# Patient Record
Sex: FEMALE | Race: WHITE | NOT HISPANIC OR LATINO | Employment: FULL TIME | URBAN - NONMETROPOLITAN AREA
[De-identification: names, ages, dates, MRNs, and addresses within clinical notes are randomized per-mention and may not be internally consistent; named-entity substitution may affect disease eponyms.]

---

## 2019-02-28 ENCOUNTER — TELEPHONE (OUTPATIENT)
Dept: FAMILY MEDICINE CLINIC | Facility: CLINIC | Age: 34
End: 2019-02-28

## 2019-02-28 NOTE — TELEPHONE ENCOUNTER
FEVER ,CHILLS,CONGESTION,COUGH NON PRODUCTIVE  TEMP 103 6  STARTED WITH TEMP DURING NIGHT ACHINESS AND ALL THAT STARTED YESTERDAY

## 2019-02-28 NOTE — TELEPHONE ENCOUNTER
Unfortunately, we have not seen her in over 3 years  She would be a new patient  She can see 1 of us today or go to urgent care

## 2019-10-28 ENCOUNTER — OFFICE VISIT (OUTPATIENT)
Dept: FAMILY MEDICINE CLINIC | Facility: CLINIC | Age: 34
End: 2019-10-28
Payer: COMMERCIAL

## 2019-10-28 VITALS
DIASTOLIC BLOOD PRESSURE: 90 MMHG | BODY MASS INDEX: 32.49 KG/M2 | SYSTOLIC BLOOD PRESSURE: 138 MMHG | WEIGHT: 195 LBS | TEMPERATURE: 98.5 F | HEIGHT: 65 IN

## 2019-10-28 DIAGNOSIS — J02.9 SORE THROAT: Primary | ICD-10-CM

## 2019-10-28 DIAGNOSIS — Z13.6 SCREENING FOR CARDIOVASCULAR CONDITION: ICD-10-CM

## 2019-10-28 PROCEDURE — 3008F BODY MASS INDEX DOCD: CPT | Performed by: FAMILY MEDICINE

## 2019-10-28 PROCEDURE — 99202 OFFICE O/P NEW SF 15 MIN: CPT | Performed by: FAMILY MEDICINE

## 2019-10-28 RX ORDER — AMOXICILLIN 500 MG/1
CAPSULE ORAL
Qty: 40 CAPSULE | Refills: 0 | Status: SHIPPED | OUTPATIENT
Start: 2019-10-28 | End: 2019-11-08

## 2019-10-28 RX ORDER — LANSOPRAZOLE 15 MG/1
15 CAPSULE, DELAYED RELEASE ORAL AS NEEDED
COMMUNITY

## 2019-10-28 NOTE — PROGRESS NOTES
Assessment/Plan: For her sore throat, Rx for amoxicillin  Push fluids  Call us if symptoms continue or increase  Patient does not wish to have flu shot  When she is feeling better, Rx given for fasting blood work to get done  We will see her back yearly and p r n  Problem List Items Addressed This Visit     None      Visit Diagnoses     Sore throat    -  Primary    Relevant Medications    amoxicillin (AMOXIL) 500 mg capsule    Screening for cardiovascular condition        Relevant Orders    Comprehensive metabolic panel    Lipid Panel with Direct LDL reflex           Diagnoses and all orders for this visit:    Sore throat  -     amoxicillin (AMOXIL) 500 mg capsule; 2 capsules po twice a day for 10 days    Screening for cardiovascular condition  -     Comprehensive metabolic panel  -     Lipid Panel with Direct LDL reflex    Other orders  -     lansoprazole (PREVACID) 15 mg capsule; Take 15 mg by mouth as needed        No problem-specific Assessment & Plan notes found for this encounter  Subjective:      Patient ID: Nikhil Ross is a 36 y o  female  Patient here today stating that for the last couple days has had a sore throat  This morning had a temperature of 99 8°  No nausea vomiting  No real congestion  It feels sharp" when she swallows  Sore Throat    This is a new problem  The current episode started in the past 7 days  The problem has been gradually worsening  Neither side of throat is experiencing more pain than the other  Maximum temperature: 99 8  The fever has been present for 1 to 2 days  The pain is moderate  Pertinent negatives include no congestion, coughing, diarrhea, ear pain, neck pain or vomiting  She has tried NSAIDs for the symptoms  The treatment provided mild relief  The following portions of the patient's history were reviewed and updated as appropriate:   She has a past medical history of GERD (gastroesophageal reflux disease)  ,  does not have a problem list on file ,   has a past surgical history that includes Eye surgery; Esophagoscopy; and Mouth surgery  ,  family history includes Cancer in her paternal grandfather; Heart attack in her paternal grandmother; Hypertension in her father, paternal grandfather, and paternal grandmother; Lung cancer in her maternal grandfather  ,   reports that she has never smoked  She has never used smokeless tobacco  She reports that she drinks alcohol  Her drug history is not on file  ,  is allergic to sulfate     Current Outpatient Medications   Medication Sig Dispense Refill    lansoprazole (PREVACID) 15 mg capsule Take 15 mg by mouth as needed      amoxicillin (AMOXIL) 500 mg capsule 2 capsules po twice a day for 10 days 40 capsule 0     No current facility-administered medications for this visit  Review of Systems   Constitutional: Negative  HENT: Positive for sore throat  Negative for congestion and ear pain  Respiratory: Negative  Negative for cough  Cardiovascular: Negative  Gastrointestinal: Negative  Negative for diarrhea and vomiting  Genitourinary: Negative  Musculoskeletal: Negative for neck pain  Objective:  Vitals:    10/28/19 1204   BP: 138/90   Temp: 98 5 °F (36 9 °C)   Weight: 88 5 kg (195 lb)   Height: 5' 5" (1 651 m)     Body mass index is 32 45 kg/m²  Physical Exam   Constitutional: She is oriented to person, place, and time  She appears well-developed and well-nourished  No distress  HENT:   Head: Normocephalic and atraumatic  Mouth/Throat: Posterior oropharyngeal erythema present  Eyes: Conjunctivae are normal    Cardiovascular: Normal rate, regular rhythm and normal heart sounds  Exam reveals no gallop and no friction rub  No murmur heard  Pulmonary/Chest: Effort normal and breath sounds normal  No respiratory distress  She has no wheezes  She has no rales  Musculoskeletal: She exhibits no edema  Neurological: She is alert and oriented to person, place, and time  Skin: She is not diaphoretic  Psychiatric: She has a normal mood and affect  Her behavior is normal  Judgment and thought content normal    Vitals reviewed

## 2020-01-06 ENCOUNTER — TELEPHONE (OUTPATIENT)
Dept: FAMILY MEDICINE CLINIC | Facility: CLINIC | Age: 35
End: 2020-01-06

## 2020-01-06 DIAGNOSIS — J02.9 SORE THROAT: Primary | ICD-10-CM

## 2020-01-06 RX ORDER — AMOXICILLIN 500 MG/1
CAPSULE ORAL
Qty: 40 CAPSULE | Refills: 0 | Status: SHIPPED | OUTPATIENT
Start: 2020-01-06 | End: 2020-01-16

## 2020-01-06 NOTE — TELEPHONE ENCOUNTER
Patient c/o sore throat and is requesting Amoxicillin be sent to the pharmacy  Patient states she had a couple days left of Amox from a previous prescription and she did take that Saturday and yesterday and is starting to feel better

## 2020-10-12 ENCOUNTER — TELEPHONE (OUTPATIENT)
Dept: FAMILY MEDICINE CLINIC | Facility: CLINIC | Age: 35
End: 2020-10-12

## 2020-10-12 DIAGNOSIS — J02.9 SORE THROAT: Primary | ICD-10-CM

## 2020-10-12 RX ORDER — AMOXICILLIN 500 MG/1
CAPSULE ORAL
Qty: 40 CAPSULE | Refills: 0 | Status: SHIPPED | OUTPATIENT
Start: 2020-10-12 | End: 2020-10-22

## 2021-01-07 ENCOUNTER — VBI (OUTPATIENT)
Dept: ADMINISTRATIVE | Facility: OTHER | Age: 36
End: 2021-01-07

## 2021-01-07 NOTE — TELEPHONE ENCOUNTER
01/07/21 11:58 AM     See documentation in the [facility]       Monica Colon

## 2021-08-10 ENCOUNTER — VBI (OUTPATIENT)
Dept: ADMINISTRATIVE | Facility: OTHER | Age: 36
End: 2021-08-10

## 2021-10-01 ENCOUNTER — VBI (OUTPATIENT)
Dept: ADMINISTRATIVE | Facility: OTHER | Age: 36
End: 2021-10-01

## 2021-12-14 ENCOUNTER — VBI (OUTPATIENT)
Dept: ADMINISTRATIVE | Facility: OTHER | Age: 36
End: 2021-12-14

## 2022-04-11 ENCOUNTER — AMB VIDEO VISIT (OUTPATIENT)
Dept: OTHER | Facility: HOSPITAL | Age: 37
End: 2022-04-11
Payer: COMMERCIAL

## 2022-04-11 DIAGNOSIS — J01.40 ACUTE PANSINUSITIS, RECURRENCE NOT SPECIFIED: Primary | ICD-10-CM

## 2022-04-11 PROCEDURE — 99212 OFFICE O/P EST SF 10 MIN: CPT | Performed by: NURSE PRACTITIONER

## 2022-04-11 RX ORDER — AMOXICILLIN 875 MG/1
875 TABLET, COATED ORAL 2 TIMES DAILY
Qty: 20 TABLET | Refills: 0 | Status: SHIPPED | OUTPATIENT
Start: 2022-04-11 | End: 2022-04-21

## 2022-04-11 NOTE — PATIENT INSTRUCTIONS
Rest and drink extra fluids  Start antibiotic  Take probiotic  OTC cough and cold medication as needed  Follow up with PCP if no improvement  Go to ER with any worsening symptoms chest pain or sob  Sinusitis   WHAT YOU NEED TO KNOW:   Sinusitis is inflammation or infection of your sinuses  Sinusitis is most often caused by a virus  Acute sinusitis may last up to 12 weeks  Chronic sinusitis lasts longer than 12 weeks  Recurrent sinusitis means you have 4 or more infections in 1 year  DISCHARGE INSTRUCTIONS:   Return to the emergency department if:   · You have trouble breathing or wheezing that is getting worse  · You have a stiff neck, a fever, or a bad headache  · You cannot open your eye  · Your eyeball bulges out or you cannot move your eye  · You are more sleepy than normal, or you notice changes in your ability to think, move, or talk  · You have swelling of your forehead or scalp  Call your doctor if:   · You have vision changes, such as double vision  · Your eye and eyelid are red, swollen, and painful  · Your symptoms do not improve or go away after 10 days  · You have nausea and are vomiting  · Your nose is bleeding  · You have questions or concerns about your condition or care  Medicines: Your symptoms may go away on their own  Your healthcare provider may recommend watchful waiting for up to 10 days before starting antibiotics  You may need any of the following:  · Acetaminophen  decreases pain and fever  It is available without a doctor's order  Ask how much to take and how often to take it  Follow directions  Read the labels of all other medicines you are using to see if they also contain acetaminophen, or ask your doctor or pharmacist  Acetaminophen can cause liver damage if not taken correctly  Do not use more than 4 grams (4,000 milligrams) total of acetaminophen in one day       · NSAIDs , such as ibuprofen, help decrease swelling, pain, and fever  This medicine is available with or without a doctor's order  NSAIDs can cause stomach bleeding or kidney problems in certain people  If you take blood thinner medicine, always ask your healthcare provider if NSAIDs are safe for you  Always read the medicine label and follow directions  · Nasal steroid sprays  may help decrease inflammation in your nose and sinuses  · Decongestants  help reduce swelling and drain mucus in the nose and sinuses  They may help you breathe easier  · Antihistamines  help dry mucus in the nose and relieve sneezing  · Antibiotics  help treat or prevent a bacterial infection  · Take your medicine as directed  Contact your healthcare provider if you think your medicine is not helping or if you have side effects  Tell him or her if you are allergic to any medicine  Keep a list of the medicines, vitamins, and herbs you take  Include the amounts, and when and why you take them  Bring the list or the pill bottles to follow-up visits  Carry your medicine list with you in case of an emergency  Self-care:   · Rinse your sinuses as directed  Use a sinus rinse device to rinse your nasal passages with a saline (salt water) solution or distilled water  Do not use tap water  This will help thin the mucus in your nose and rinse away pollen and dirt  It will also help reduce swelling so you can breathe normally  · Use a humidifier  to increase air moisture in your home  This may make it easier for you to breathe and help decrease your cough  · Sleep with your head elevated  Place an extra pillow under your head before you go to sleep to help your sinuses drain  · Drink liquids as directed  Ask your healthcare provider how much liquid to drink each day and which liquids are best for you  Liquids will thin the mucus in your nose and help it drain  Avoid drinks that contain alcohol or caffeine  · Do not smoke, and avoid secondhand smoke    Nicotine and other chemicals in cigarettes and cigars can make your symptoms worse  Ask your healthcare provider for information if you currently smoke and need help to quit  E-cigarettes or smokeless tobacco still contain nicotine  Talk to your healthcare provider before you use these products  Prevent the spread of germs:   · Wash your hands often with soap and water  Wash your hands after you use the bathroom, change a child's diaper, or sneeze  Wash your hands before you prepare or eat food  · Stay away from people who are sick  Some germs spread easily and quickly through contact  Follow up with your doctor as directed: You may be referred to an ear, nose, and throat specialist  Write down your questions so you remember to ask them during your visits  © Copyright [employer] 2022 Information is for End User's use only and may not be sold, redistributed or otherwise used for commercial purposes  All illustrations and images included in CareNotes® are the copyrighted property of A D A M , Inc  or [employer]   The above information is an  only  It is not intended as medical advice for individual conditions or treatments  Talk to your doctor, nurse or pharmacist before following any medical regimen to see if it is safe and effective for you

## 2022-04-11 NOTE — CARE ANYWHERE EVISITS
Visit Summary for Sameera Clayton - Gender: Female - Date of Birth: [id number]  Date: [id number] - Duration: 4 minutes  Patient: Sameera Clayton  Provider: Nakita DRAKE    Patient Contact Information  Address  [address] [place] [address]; [address], [address]  [phone]    Visit Topics  Cold [Added By: Self - 2022-04-11]  Earache [Added By: Self - 2022-04-11]    Triage Questions   What is your current physical address in the event of a medical emergency? Answer []  Are you allergic to any medications? Answer []  Are you now or could you be pregnant? Answer []  Do you have any immune system compromise or chronic lung   disease? Answer []  Do you have any vulnerable family members in the home (infant, pregnant, cancer, elderly)? Answer []     Conversation Transcripts  [0A][0A] [Notification] You are connected with Raina Keith, Urgent Care Specialist [0A][Notification] Ellyn Kocher is located in [place]  [0A][Notification] Ellyn Kocher has shared health history  Radha Lou  [0A]    Diagnosis  Acute pansinusitis    Procedures  Value: [id number] Code: CPT-4 UNLISTED E&M SERVICE    Medications Prescribed    No prescriptions ordered    Electronically signed by: Raina Rivas(NPI [id number])

## 2022-04-11 NOTE — PROGRESS NOTES
Video Visit - Anusha Cordero 29 y o  female MRN: [id number]    REQUIRED DOCUMENTATION:         1  This service was provided via [employer]  2  Provider located at [address] [place], [place] [id number]  3  [facility] provider: NOELLE Talamantes  4  Identify all parties in room with patient during [facility] visit:  Patient   5  After connecting through [employer]), patient was identified by name and date of birth  Patient was then informed that this was a Telemedicine visit and that the exam was being conducted confidentially over secure lines  My office door was closed  No one else was in the room  Patient acknowledged consent and understanding of privacy and security of the Telemedicine visit  I informed the patient that I have reviewed their record in Epic and presented the opportunity for them to ask any questions regarding the visit today  The patient agreed to participate  This is a 29year old femal here today for video visit  Started with cold 6 days ago  She is now having increased sinus pressure and headache  She is having right ear pain  She is having some soreness into neck  No fever, body aches or chills  Cough at night form PND,  She is having some yellowish green nasal discharge in AM   No chest pain or sob  No covid vaccine or no no expsoure to covid or flu  History of sinus infection in past and symptoms are similar  Review of Systems   Constitutional: Positive for activity change, chills, fatigue and fever  HENT: Positive for postnasal drip, rhinorrhea, sinus pressure and sinus pain  Negative for sore throat and tinnitus  Respiratory: Negative for cough  Musculoskeletal: Negative  Skin: Negative  Neurological: Negative  Psychiatric/Behavioral: Negative  Physical Exam  Constitutional:       General: She is not in acute distress  Appearance: She is not ill-appearing or toxic-appearing  HENT:      Head: Normocephalic        Nose: Congestion and rhinorrhea present  Pulmonary:      Effort: Pulmonary effort is normal  No respiratory distress  Comments: No cough or audible wheezing   Able to speak in full sentences   Skin:     Comments: No rash on head or neck  Neurological:      Mental Status: She is alert and oriented to person, place, and time  Psychiatric:         Mood and Affect: Mood normal          Thought Content: Thought content normal          Judgment: Judgment normal        Diagnoses and all orders for this visit:    Acute pansinusitis, recurrence not specified  -     amoxicillin (AMOXIL) 875 mg tablet; Take 1 tablet (875 mg total) by mouth 2 (two) times a day for 10 days      Patient Instructions     Rest and drink extra fluids  Start antibiotic  Take probiotic  OTC cough and cold medication as needed  Follow up with PCP if no improvement  Go to ER with any worsening symptoms chest pain or sob  Sinusitis   WHAT YOU NEED TO KNOW:   Sinusitis is inflammation or infection of your sinuses  Sinusitis is most often caused by a virus  Acute sinusitis may last up to 12 weeks  Chronic sinusitis lasts longer than 12 weeks  Recurrent sinusitis means you have 4 or more infections in 1 year  DISCHARGE INSTRUCTIONS:   Return to the emergency department if:   · You have trouble breathing or wheezing that is getting worse  · You have a stiff neck, a fever, or a bad headache  · You cannot open your eye  · Your eyeball bulges out or you cannot move your eye  · You are more sleepy than normal, or you notice changes in your ability to think, move, or talk  · You have swelling of your forehead or scalp  Call your doctor if:   · You have vision changes, such as double vision  · Your eye and eyelid are red, swollen, and painful  · Your symptoms do not improve or go away after 10 days  · You have nausea and are vomiting  · Your nose is bleeding      · You have questions or concerns about your condition or care  Medicines: Your symptoms may go away on their own  Your healthcare provider may recommend watchful waiting for up to 10 days before starting antibiotics  You may need any of the following:  · Acetaminophen  decreases pain and fever  It is available without a doctor's order  Ask how much to take and how often to take it  Follow directions  Read the labels of all other medicines you are using to see if they also contain acetaminophen, or ask your doctor or pharmacist  Acetaminophen can cause liver damage if not taken correctly  Do not use more than 4 grams (4,000 milligrams) total of acetaminophen in one day  · NSAIDs , such as ibuprofen, help decrease swelling, pain, and fever  This medicine is available with or without a doctor's order  NSAIDs can cause stomach bleeding or kidney problems in certain people  If you take blood thinner medicine, always ask your healthcare provider if NSAIDs are safe for you  Always read the medicine label and follow directions  · Nasal steroid sprays  may help decrease inflammation in your nose and sinuses  · Decongestants  help reduce swelling and drain mucus in the nose and sinuses  They may help you breathe easier  · Antihistamines  help dry mucus in the nose and relieve sneezing  · Antibiotics  help treat or prevent a bacterial infection  · Take your medicine as directed  Contact your healthcare provider if you think your medicine is not helping or if you have side effects  Tell him or her if you are allergic to any medicine  Keep a list of the medicines, vitamins, and herbs you take  Include the amounts, and when and why you take them  Bring the list or the pill bottles to follow-up visits  Carry your medicine list with you in case of an emergency  Self-care:   · Rinse your sinuses as directed  Use a sinus rinse device to rinse your nasal passages with a saline (salt water) solution or distilled water  Do not use tap water   This will help thin the mucus in your nose and rinse away pollen and dirt  It will also help reduce swelling so you can breathe normally  · Use a humidifier  to increase air moisture in your home  This may make it easier for you to breathe and help decrease your cough  · Sleep with your head elevated  Place an extra pillow under your head before you go to sleep to help your sinuses drain  · Drink liquids as directed  Ask your healthcare provider how much liquid to drink each day and which liquids are best for you  Liquids will thin the mucus in your nose and help it drain  Avoid drinks that contain alcohol or caffeine  · Do not smoke, and avoid secondhand smoke  Nicotine and other chemicals in cigarettes and cigars can make your symptoms worse  Ask your healthcare provider for information if you currently smoke and need help to quit  E-cigarettes or smokeless tobacco still contain nicotine  Talk to your healthcare provider before you use these products  Prevent the spread of germs:   · Wash your hands often with soap and water  Wash your hands after you use the bathroom, change a child's diaper, or sneeze  Wash your hands before you prepare or eat food  · Stay away from people who are sick  Some germs spread easily and quickly through contact  Follow up with your doctor as directed: You may be referred to an ear, nose, and throat specialist  Write down your questions so you remember to ask them during your visits  © Copyright [employer] 2022 Information is for End User's use only and may not be sold, redistributed or otherwise used for commercial purposes  All illustrations and images included in CareNotes® are the copyrighted property of A D A M , Inc  or [facility] [employer]lu[employer]   The above information is an  only  It is not intended as medical advice for individual conditions or treatments   Talk to your doctor, nurse or pharmacist before following any medical regimen to see if it is safe and effective for you  Follow up with PCP if not improved, if symptoms are worse, go to the ER

## 2022-12-12 ENCOUNTER — AMB VIDEO VISIT (OUTPATIENT)
Dept: OTHER | Facility: HOSPITAL | Age: 37
End: 2022-12-12

## 2022-12-12 DIAGNOSIS — J01.90 ACUTE SINUSITIS, RECURRENCE NOT SPECIFIED, UNSPECIFIED LOCATION: Primary | ICD-10-CM

## 2022-12-12 RX ORDER — AMOXICILLIN AND CLAVULANATE POTASSIUM 875; 125 MG/1; MG/1
1 TABLET, FILM COATED ORAL EVERY 12 HOURS SCHEDULED
Qty: 20 TABLET | Refills: 0 | Status: SHIPPED | OUTPATIENT
Start: 2022-12-12 | End: 2022-12-22

## 2022-12-12 NOTE — PATIENT INSTRUCTIONS
We will start antibiotic  Take probiotic  Rest and drink extra fluids  Nasal saline flushes as needed  Flonase and Fely pot can also be helpful  Follow-up with PCP if no improvement  Go to the ER with any worsening symptoms, chest pain, shortness of breath or difficulty breathing  Sinusitis   WHAT YOU NEED TO KNOW:   Sinusitis is inflammation or infection of your sinuses  Sinusitis is most often caused by a virus  Acute sinusitis may last up to 12 weeks  Chronic sinusitis lasts longer than 12 weeks  Recurrent sinusitis means you have 4 or more infections in 1 year  DISCHARGE INSTRUCTIONS:   Return to the emergency department if:   You have trouble breathing or wheezing that is getting worse  You have a stiff neck, a fever, or a bad headache  You cannot open your eye  Your eyeball bulges out or you cannot move your eye  You are more sleepy than normal, or you notice changes in your ability to think, move, or talk  You have swelling of your forehead or scalp  Call your doctor if:   You have vision changes, such as double vision  Your eye and eyelid are red, swollen, and painful  Your symptoms do not improve or go away after 10 days  You have nausea and are vomiting  Your nose is bleeding  You have questions or concerns about your condition or care  Medicines: Your symptoms may go away on their own  Your healthcare provider may recommend watchful waiting for up to 10 days before starting antibiotics  You may need any of the following:  Acetaminophen  decreases pain and fever  It is available without a doctor's order  Ask how much to take and how often to take it  Follow directions  Read the labels of all other medicines you are using to see if they also contain acetaminophen, or ask your doctor or pharmacist  Acetaminophen can cause liver damage if not taken correctly  Do not use more than 4 grams (4,000 milligrams) total of acetaminophen in one day       NSAIDs , such as ibuprofen, help decrease swelling, pain, and fever  This medicine is available with or without a doctor's order  NSAIDs can cause stomach bleeding or kidney problems in certain people  If you take blood thinner medicine, always ask your healthcare provider if NSAIDs are safe for you  Always read the medicine label and follow directions  Nasal steroid sprays  may help decrease inflammation in your nose and sinuses  Decongestants  help reduce swelling and drain mucus in the nose and sinuses  They may help you breathe easier  Antihistamines  help dry mucus in the nose and relieve sneezing  Antibiotics  help treat or prevent a bacterial infection  Take your medicine as directed  Contact your healthcare provider if you think your medicine is not helping or if you have side effects  Tell him or her if you are allergic to any medicine  Keep a list of the medicines, vitamins, and herbs you take  Include the amounts, and when and why you take them  Bring the list or the pill bottles to follow-up visits  Carry your medicine list with you in case of an emergency  Self-care:   Rinse your sinuses as directed  Use a sinus rinse device to rinse your nasal passages with a saline (salt water) solution or distilled water  Do not use tap water  This will help thin the mucus in your nose and rinse away pollen and dirt  It will also help reduce swelling so you can breathe normally  Use a humidifier  to increase air moisture in your home  This may make it easier for you to breathe and help decrease your cough  Sleep with your head elevated  Place an extra pillow under your head before you go to sleep to help your sinuses drain  Drink liquids as directed  Ask your healthcare provider how much liquid to drink each day and which liquids are best for you  Liquids will thin the mucus in your nose and help it drain  Avoid drinks that contain alcohol or caffeine       Do not smoke, and avoid secondhand smoke  Nicotine and other chemicals in cigarettes and cigars can make your symptoms worse  Ask your healthcare provider for information if you currently smoke and need help to quit  E-cigarettes or smokeless tobacco still contain nicotine  Talk to your healthcare provider before you use these products  Prevent the spread of germs:   Wash your hands often with soap and water  Wash your hands after you use the bathroom, change a child's diaper, or sneeze  Wash your hands before you prepare or eat food  Stay away from people who are sick  Some germs spread easily and quickly through contact  Follow up with your doctor as directed: You may be referred to an ear, nose, and throat specialist  Write down your questions so you remember to ask them during your visits  © Copyright [employer] 2022 Information is for End User's use only and may not be sold, redistributed or otherwise used for commercial purposes  All illustrations and images included in CareNotes® are the copyrighted property of A D A M , Inc  or [facility] Lluvia Wilder   The above information is an  only  It is not intended as medical advice for individual conditions or treatments  Talk to your doctor, nurse or pharmacist before following any medical regimen to see if it is safe and effective for you

## 2022-12-12 NOTE — CARE ANYWHERE EVISITS
Visit Summary for BHAVIN DE LA CRUZ - Gender: Female - Date of Birth: [id number]  Date: [id number] - Duration: 3 minutes  Patient: BHAVIN DE LA CRUZ  Provider: Vito DRAKE    Patient Contact Information  Address  [address] [place] [address]; [address], [address]  [phone]    Visit Topics  Cold [Added By: Self - 2022-12-12]    Triage Questions   What is your current physical address in the event of a medical emergency? Answer []  Are you allergic to any medications? Answer []  Are you now or could you be pregnant? Answer []  Do you have any immune system compromise or chronic lung   disease? Answer []  Do you have any vulnerable family members in the home (infant, pregnant, cancer, elderly)? Answer []     Conversation Transcripts  [0A][0A] [Notification] You are connected with Raina Guardado, Urgent Care Specialist [0A][Notification] [address] is located in [place]  [0A][Notification] BHAVIN DE LA CRUZ has shared health history  Gerhardt Sep  [0A]    Diagnosis  Acute pansinusitis    Procedures  Value: [id number] Code: CPT-4 UNLISTED E&M SERVICE    Medications Prescribed    No prescriptions ordered    Electronically signed by: Raina Aguiar(NPI [id number])

## 2022-12-12 NOTE — PROGRESS NOTES
Video Visit - Zandra Cooks 40 y o  female MRN: [id number]    REQUIRED DOCUMENTATION:         1  [facility]  2  Provider located at [address] [place] [id number] [phone]  3  [facility] provider: NOELLE Soto  4  Identify all parties in room with patient during [facility] visit:  Patient   5  After connecting through [employer], patient was identified by name and date of birth  Patient was then informed that this was a Telemedicine visit and that the exam was being conducted confidentially over secure lines  My office door was closed  No one else was in the room  Patient acknowledged consent and understanding of privacy and security of the Telemedicine visit  I informed the patient that I have reviewed their record in Epic and presented the opportunity for them to ask any questions regarding the visit today  The patient agreed to participate  This is a 50-year-old female here today for video visit  She states has been having nasal congestion and sinus pressure  She states that the start about 1 week ago  She denies any fevers, bodies or chills  She denies any chest pain or shortness of breath  She did not take a home COVID test   She is not vaccinated for COVID  She states he is having yellowish nasal discharge  Review of Systems   Constitutional: Negative for activity change, chills, fatigue and fever  HENT: Positive for congestion, rhinorrhea, sinus pressure and sinus pain  Respiratory: Negative for cough, shortness of breath and wheezing  Cardiovascular: Negative  Neurological: Negative  Psychiatric/Behavioral: Negative  There were no vitals filed for this visit  Physical Exam  Constitutional:       General: She is not in acute distress  Appearance: Normal appearance  She is not ill-appearing or toxic-appearing  HENT:      Head: Normocephalic and atraumatic        Comments: Sinus pressure     Nose: Congestion present  Eyes:      Conjunctiva/sclera: Conjunctivae normal    Pulmonary:      Effort: Pulmonary effort is normal  No respiratory distress  Comments: No cough or audible wheezing during video visit  Skin:     Comments: No rash on head or neck   Neurological:      Mental Status: She is alert and oriented to person, place, and time  Psychiatric:         Mood and Affect: Mood normal          Behavior: Behavior normal          Thought Content: Thought content normal          Judgment: Judgment normal        Diagnoses and all orders for this visit:    Acute sinusitis, recurrence not specified, unspecified location  -     amoxicillin-clavulanate (AUGMENTIN) 875-125 mg per tablet; Take 1 tablet by mouth every 12 (twelve) hours for 10 days      Patient Instructions     We will start antibiotic  Take probiotic  Rest and drink extra fluids  Nasal saline flushes as needed  Flonase and Fely pot can also be helpful  Follow-up with PCP if no improvement  Go to the ER with any worsening symptoms, chest pain, shortness of breath or difficulty breathing  Sinusitis   WHAT YOU NEED TO KNOW:   Sinusitis is inflammation or infection of your sinuses  Sinusitis is most often caused by a virus  Acute sinusitis may last up to 12 weeks  Chronic sinusitis lasts longer than 12 weeks  Recurrent sinusitis means you have 4 or more infections in 1 year  DISCHARGE INSTRUCTIONS:   Return to the emergency department if:   · You have trouble breathing or wheezing that is getting worse  · You have a stiff neck, a fever, or a bad headache  · You cannot open your eye  · Your eyeball bulges out or you cannot move your eye  · You are more sleepy than normal, or you notice changes in your ability to think, move, or talk  · You have swelling of your forehead or scalp  Call your doctor if:   · You have vision changes, such as double vision  · Your eye and eyelid are red, swollen, and painful  · Your symptoms do not improve or go away after 10 days  · You have nausea and are vomiting  · Your nose is bleeding  · You have questions or concerns about your condition or care  Medicines: Your symptoms may go away on their own  Your healthcare provider may recommend watchful waiting for up to 10 days before starting antibiotics  You may need any of the following:  · Acetaminophen  decreases pain and fever  It is available without a doctor's order  Ask how much to take and how often to take it  Follow directions  Read the labels of all other medicines you are using to see if they also contain acetaminophen, or ask your doctor or pharmacist  Acetaminophen can cause liver damage if not taken correctly  Do not use more than 4 grams (4,000 milligrams) total of acetaminophen in one day  · NSAIDs , such as ibuprofen, help decrease swelling, pain, and fever  This medicine is available with or without a doctor's order  NSAIDs can cause stomach bleeding or kidney problems in certain people  If you take blood thinner medicine, always ask your healthcare provider if NSAIDs are safe for you  Always read the medicine label and follow directions  · Nasal steroid sprays  may help decrease inflammation in your nose and sinuses  · Decongestants  help reduce swelling and drain mucus in the nose and sinuses  They may help you breathe easier  · Antihistamines  help dry mucus in the nose and relieve sneezing  · Antibiotics  help treat or prevent a bacterial infection  · Take your medicine as directed  Contact your healthcare provider if you think your medicine is not helping or if you have side effects  Tell him or her if you are allergic to any medicine  Keep a list of the medicines, vitamins, and herbs you take  Include the amounts, and when and why you take them  Bring the list or the pill bottles to follow-up visits  Carry your medicine list with you in case of an emergency      Self-care: · Rinse your sinuses as directed  Use a sinus rinse device to rinse your nasal passages with a saline (salt water) solution or distilled water  Do not use tap water  This will help thin the mucus in your nose and rinse away pollen and dirt  It will also help reduce swelling so you can breathe normally  · Use a humidifier  to increase air moisture in your home  This may make it easier for you to breathe and help decrease your cough  · Sleep with your head elevated  Place an extra pillow under your head before you go to sleep to help your sinuses drain  · Drink liquids as directed  Ask your healthcare provider how much liquid to drink each day and which liquids are best for you  Liquids will thin the mucus in your nose and help it drain  Avoid drinks that contain alcohol or caffeine  · Do not smoke, and avoid secondhand smoke  Nicotine and other chemicals in cigarettes and cigars can make your symptoms worse  Ask your healthcare provider for information if you currently smoke and need help to quit  E-cigarettes or smokeless tobacco still contain nicotine  Talk to your healthcare provider before you use these products  Prevent the spread of germs:   · Wash your hands often with soap and water  Wash your hands after you use the bathroom, change a child's diaper, or sneeze  Wash your hands before you prepare or eat food  · Stay away from people who are sick  Some germs spread easily and quickly through contact  Follow up with your doctor as directed: You may be referred to an ear, nose, and throat specialist  Write down your questions so you remember to ask them during your visits  © Copyright [employer] 2022 Information is for End User's use only and may not be sold, redistributed or otherwise used for commercial purposes   All illustrations and images included in CareNotes® are the copyrighted property of [employer]  or [employer]  The above information is an  only  It is not intended as medical advice for individual conditions or treatments  Talk to your doctor, nurse or pharmacist before following any medical regimen to see if it is safe and effective for you  Follow up with PCP if not improved, if symptoms are worse, go to the ER

## 2023-03-15 ENCOUNTER — AMB VIDEO VISIT (OUTPATIENT)
Dept: OTHER | Facility: HOSPITAL | Age: 38
End: 2023-03-15

## 2023-03-15 DIAGNOSIS — J01.00 ACUTE NON-RECURRENT MAXILLARY SINUSITIS: Primary | ICD-10-CM

## 2023-03-15 RX ORDER — FLUTICASONE PROPIONATE 50 MCG
1 SPRAY, SUSPENSION (ML) NASAL DAILY
Qty: 9.9 ML | Refills: 0 | Status: SHIPPED | OUTPATIENT
Start: 2023-03-15

## 2023-03-15 RX ORDER — AMOXICILLIN AND CLAVULANATE POTASSIUM 875; 125 MG/1; MG/1
1 TABLET, FILM COATED ORAL EVERY 12 HOURS SCHEDULED
Qty: 20 TABLET | Refills: 0 | Status: SHIPPED | OUTPATIENT
Start: 2023-03-15 | End: 2023-03-25

## 2023-03-15 NOTE — PROGRESS NOTES
Video Visit - Dina Salcedo 40 y o  female MRN: [id number]    REQUIRED DOCUMENTATION:         1  [facility]  2  Provider located at [address] [place] [id number] [phone]  3  [facility] provider: Regan Moise PA-C   4  Identify all parties in room with patient during [facility] visit:  No one else  5  After connecting through [employer], patient was identified by name and date of birth  Patient was then informed that this was a Telemedicine visit and that the exam was being conducted confidentially over secure lines  My office door was closed  No one else was in the room  Patient acknowledged consent and understanding of privacy and security of the Telemedicine visit  I informed the patient that I have reviewed their record in Epic and presented the opportunity for them to ask any questions regarding the visit today  The patient agreed to participate  HPI  Patient states she has had a cold for 1 5-2 weeks  It was getting better but now she started with a sore throat and yellow, green mucus today  She is having sinus pain and pressure  She is taking ibuprofen  She had a negative covid test       Review of Systems   Constitutional: Negative  HENT: Positive for congestion and sore throat  Respiratory: Negative  Cardiovascular: Negative  Gastrointestinal: Negative  Musculoskeletal: Negative  Neurological: Negative  Psychiatric/Behavioral: Negative  Physical Exam  Constitutional:       General: She is not in acute distress  Appearance: Normal appearance  She is not ill-appearing, toxic-appearing or diaphoretic  HENT:      Head: Normocephalic and atraumatic  Nose: Congestion present  Comments: TTP over sinuses  Pulmonary:      Effort: Pulmonary effort is normal  No respiratory distress  Lymphadenopathy:      Cervical: Cervical adenopathy present  Skin:     General: Skin is dry     Neurological: General: No focal deficit present  Mental Status: She is alert and oriented to person, place, and time  Psychiatric:         Mood and Affect: Mood normal          Behavior: Behavior normal          Diagnoses and all orders for this visit:    Acute non-recurrent maxillary sinusitis  -     amoxicillin-clavulanate (AUGMENTIN) 875-125 mg per tablet; Take 1 tablet by mouth every 12 (twelve) hours for 10 days  -     fluticasone (FLONASE) 50 mcg/act nasal spray; 1 spray into each nostril daily      Patient Instructions     Sinusitis   WHAT YOU NEED TO KNOW:   Sinusitis is inflammation or infection of your sinuses  Sinusitis is most often caused by a virus  Acute sinusitis may last up to 12 weeks  Chronic sinusitis lasts longer than 12 weeks  Recurrent sinusitis means you have 4 or more infections in 1 year  DISCHARGE INSTRUCTIONS:   Return to the emergency department if:   • You have trouble breathing or wheezing that is getting worse  • You have a stiff neck, a fever, or a bad headache  • You cannot open your eye  • Your eyeball bulges out or you cannot move your eye  • You are more sleepy than normal, or you notice changes in your ability to think, move, or talk  • You have swelling of your forehead or scalp  Call your doctor if:   • You have vision changes, such as double vision  • Your eye and eyelid are red, swollen, and painful  • Your symptoms do not improve or go away after 10 days  • You have nausea and are vomiting  • Your nose is bleeding  • You have questions or concerns about your condition or care  Medicines: Your symptoms may go away on their own  Your healthcare provider may recommend watchful waiting for up to 10 days before starting antibiotics  You may need any of the following:  • Acetaminophen  decreases pain and fever  It is available without a doctor's order  Ask how much to take and how often to take it  Follow directions   Read the labels of all other medicines you are using to see if they also contain acetaminophen, or ask your doctor or pharmacist  Acetaminophen can cause liver damage if not taken correctly  • NSAIDs , such as ibuprofen, help decrease swelling, pain, and fever  This medicine is available with or without a doctor's order  NSAIDs can cause stomach bleeding or kidney problems in certain people  If you take blood thinner medicine, always ask your healthcare provider if NSAIDs are safe for you  Always read the medicine label and follow directions  • Nasal steroid sprays  may help decrease inflammation in your nose and sinuses  • Decongestants  help reduce swelling and drain mucus in the nose and sinuses  They may help you breathe easier  • Antihistamines  help dry mucus in the nose and relieve sneezing  • Antibiotics  help treat or prevent a bacterial infection  • Take your medicine as directed  Contact your healthcare provider if you think your medicine is not helping or if you have side effects  Tell your provider if you are allergic to any medicine  Keep a list of the medicines, vitamins, and herbs you take  Include the amounts, and when and why you take them  Bring the list or the pill bottles to follow-up visits  Carry your medicine list with you in case of an emergency  Self-care:   • Rinse your sinuses as directed  Use a sinus rinse device to rinse your nasal passages with a saline (salt water) solution or distilled water  Do not use tap water  This will help thin the mucus in your nose and rinse away pollen and dirt  It will also help reduce swelling so you can breathe normally  • Use a humidifier  to increase air moisture in your home  This may make it easier for you to breathe and help decrease your cough  • Sleep with your head elevated  Place an extra pillow under your head before you go to sleep to help your sinuses drain  • Drink liquids as directed    Ask your healthcare provider how much liquid to drink each day and which liquids are best for you  Liquids will thin the mucus in your nose and help it drain  Avoid drinks that contain alcohol or caffeine  • Do not smoke, and avoid secondhand smoke  Nicotine and other chemicals in cigarettes and cigars can make your symptoms worse  Ask your healthcare provider for information if you currently smoke and need help to quit  E-cigarettes or smokeless tobacco still contain nicotine  Talk to your healthcare provider before you use these products  Prevent the spread of germs:   • Wash your hands often with soap and water  Wash your hands after you use the bathroom, change a child's diaper, or sneeze  Wash your hands before you prepare or eat food  • Stay away from people who are sick  Some germs spread easily and quickly through contact  Follow up with your doctor as directed: You may be referred to an ear, nose, and throat specialist  Write down your questions so you remember to ask them during your visits  © Copyright [facility] 2022 Information is for End User's use only and may not be sold, redistributed or otherwise used for commercial purposes  The above information is an  only  It is not intended as medical advice for individual conditions or treatments  Talk to your doctor, nurse or pharmacist before following any medical regimen to see if it is safe and effective for you

## 2023-03-15 NOTE — CARE ANYWHERE EVISITS
Visit Summary for BHAVIN DE LA CRUZ - Gender: Female - Date of Birth: [id number]  Date: [id number] - Duration: 2 minutes  Patient: BHAVIN DE LA CRUZ  Provider: Clemente Segovia PA-C    Patient Contact Information  Address  [address] [place] [address]; [address] [place] [address] Drive, [address]  [phone]    Visit Topics  Cold [Added By: Self - 2023-03-15]    Triage Questions   What is your current physical address in the event of a medical emergency? Answer []  Are you allergic to any medications? Answer []  Are you now or could you be pregnant? Answer []  Do you have any immune system compromise or chronic lung   disease? Answer []  Do you have any vulnerable family members in the home (infant, pregnant, cancer, elderly)? Answer []     Conversation Transcripts  [0A][0A] [Notification] You are connected with Clemente Segovia PA-C, Urgent Care Specialist [0A][Notification] BHAVIN Kimball is located in [place]  [0A][Notification] BHAVIN DE LA CRUZ has shared health history  Marisa Lorenzo  [0A]    Diagnosis  Acute maxillary sinusitis, unspecified    Procedures  Value: [id number] Code: CPT-4 UNLISTED E&M SERVICE    Medications Prescribed    No prescriptions ordered    Electronically signed by: Amelia Montero(NPI [id number])

## 2023-03-15 NOTE — PATIENT INSTRUCTIONS
Sinusitis   WHAT YOU NEED TO KNOW:   Sinusitis is inflammation or infection of your sinuses  Sinusitis is most often caused by a virus  Acute sinusitis may last up to 12 weeks  Chronic sinusitis lasts longer than 12 weeks  Recurrent sinusitis means you have 4 or more infections in 1 year  DISCHARGE INSTRUCTIONS:   Return to the emergency department if:   You have trouble breathing or wheezing that is getting worse  You have a stiff neck, a fever, or a bad headache  You cannot open your eye  Your eyeball bulges out or you cannot move your eye  You are more sleepy than normal, or you notice changes in your ability to think, move, or talk  You have swelling of your forehead or scalp  Call your doctor if:   You have vision changes, such as double vision  Your eye and eyelid are red, swollen, and painful  Your symptoms do not improve or go away after 10 days  You have nausea and are vomiting  Your nose is bleeding  You have questions or concerns about your condition or care  Medicines: Your symptoms may go away on their own  Your healthcare provider may recommend watchful waiting for up to 10 days before starting antibiotics  You may need any of the following:  Acetaminophen  decreases pain and fever  It is available without a doctor's order  Ask how much to take and how often to take it  Follow directions  Read the labels of all other medicines you are using to see if they also contain acetaminophen, or ask your doctor or pharmacist  Acetaminophen can cause liver damage if not taken correctly  NSAIDs , such as ibuprofen, help decrease swelling, pain, and fever  This medicine is available with or without a doctor's order  NSAIDs can cause stomach bleeding or kidney problems in certain people  If you take blood thinner medicine, always ask your healthcare provider if NSAIDs are safe for you  Always read the medicine label and follow directions      Nasal steroid sprays may help decrease inflammation in your nose and sinuses  Decongestants  help reduce swelling and drain mucus in the nose and sinuses  They may help you breathe easier  Antihistamines  help dry mucus in the nose and relieve sneezing  Antibiotics  help treat or prevent a bacterial infection  Take your medicine as directed  Contact your healthcare provider if you think your medicine is not helping or if you have side effects  Tell your provider if you are allergic to any medicine  Keep a list of the medicines, vitamins, and herbs you take  Include the amounts, and when and why you take them  Bring the list or the pill bottles to follow-up visits  Carry your medicine list with you in case of an emergency  Self-care:   Rinse your sinuses as directed  Use a sinus rinse device to rinse your nasal passages with a saline (salt water) solution or distilled water  Do not use tap water  This will help thin the mucus in your nose and rinse away pollen and dirt  It will also help reduce swelling so you can breathe normally  Use a humidifier  to increase air moisture in your home  This may make it easier for you to breathe and help decrease your cough  Sleep with your head elevated  Place an extra pillow under your head before you go to sleep to help your sinuses drain  Drink liquids as directed  Ask your healthcare provider how much liquid to drink each day and which liquids are best for you  Liquids will thin the mucus in your nose and help it drain  Avoid drinks that contain alcohol or caffeine  Do not smoke, and avoid secondhand smoke  Nicotine and other chemicals in cigarettes and cigars can make your symptoms worse  Ask your healthcare provider for information if you currently smoke and need help to quit  E-cigarettes or smokeless tobacco still contain nicotine  Talk to your healthcare provider before you use these products      Prevent the spread of germs:   Wash your hands often with soap and water  Wash your hands after you use the bathroom, change a child's diaper, or sneeze  Wash your hands before you prepare or eat food  Stay away from people who are sick  Some germs spread easily and quickly through contact  Follow up with your doctor as directed: You may be referred to an ear, nose, and throat specialist  Write down your questions so you remember to ask them during your visits  © Copyright [employer] 2022 Information is for End User's use only and may not be sold, redistributed or otherwise used for commercial purposes  The above information is an  only  It is not intended as medical advice for individual conditions or treatments  Talk to your doctor, nurse or pharmacist before following any medical regimen to see if it is safe and effective for you

## 2024-02-09 ENCOUNTER — OFFICE VISIT (OUTPATIENT)
Dept: URGENT CARE | Facility: MEDICAL CENTER | Age: 39
End: 2024-02-09
Payer: COMMERCIAL

## 2024-02-09 VITALS
RESPIRATION RATE: 18 BRPM | OXYGEN SATURATION: 99 % | TEMPERATURE: 98.1 F | HEART RATE: 100 BPM | SYSTOLIC BLOOD PRESSURE: 110 MMHG | DIASTOLIC BLOOD PRESSURE: 68 MMHG

## 2024-02-09 DIAGNOSIS — J02.0 STREP PHARYNGITIS: Primary | ICD-10-CM

## 2024-02-09 LAB — S PYO AG THROAT QL: POSITIVE

## 2024-02-09 PROCEDURE — 87880 STREP A ASSAY W/OPTIC: CPT

## 2024-02-09 PROCEDURE — 99212 OFFICE O/P EST SF 10 MIN: CPT

## 2024-02-09 RX ORDER — AMOXICILLIN 500 MG/1
500 CAPSULE ORAL EVERY 12 HOURS SCHEDULED
Qty: 20 CAPSULE | Refills: 0 | Status: SHIPPED | OUTPATIENT
Start: 2024-02-09 | End: 2024-02-19

## 2024-02-09 NOTE — PATIENT INSTRUCTIONS
You may take over the counter Tylenol (Acetaminophen) and/or Motrin (Ibuprofen) as needed, as directed on packaging.   Be sure to get plenty of rest, and drinking fluids to remain hydrated.     Please follow up with your primary provider in the next several days. Should you have any worsening of symptoms, or lack of improvement please be re-evaluated. If needed for significant concerns, consider 911 or ER evaluation.     Strep throat is contagious. It's spread through droplets such as when you sneeze layne cough. It is also shared through shared food and drinks. You should avoid sharing any cups, utensils, drinks/food etc. It can also be picked up from surfaces which have been touched if someone sneezes into their hand then touches the surface. Once starting antibiotics strep throat usually is no longer contagious after 24-48 hours.     You should replace your toothbrush after strep throat. I recommend you replace it after 2-3 days of antibiotic use. Be sure to treat your symptoms to help you feel better faster. Making sure you get plenty of fluids, and treating any fevers with normal over the counter medications. You may also try over the counter throat spray or throat lozenges if appropriate to help sooth the throat. (Throat lozenges are not recommended in young children as they can be a choking hazard).

## 2024-02-09 NOTE — PROGRESS NOTES
[facility]        NAME: Sameera Clayton is a 38 y.o. female  : 1985    MRN: [id number]  DATE: 2024  TIME: 9:51 AM    Assessment and Plan   Strep pharyngitis [J02.0]  1. Strep pharyngitis  amoxicillin (AMOXIL) 500 mg capsule    POCT rapid ANTIGEN strepA            Patient Instructions   Please see the patient's After Visit Summary for patient provided instructions.   Other verbal instructions given as noted within.    Follow up with PCP in 3-5 days.  Proceed to  ER if symptoms worsen.    Chief Complaint     Chief Complaint   Patient presents with    Sore Throat    Nasal Congestion    Chills    Nausea         History of Present Illness       Patient here with reports of onset Wednesday with fever, upset stomach, chills and fatigue. Last night started with sore throat and it is now more severe today. At home took ibuprofen last dose was this AM. Unknown ill exposures however she is a .        Review of Systems   Review of Systems   Constitutional:  Positive for appetite change, fatigue and fever. Negative for chills.   HENT:  Positive for sore throat. Negative for congestion, ear pain, postnasal drip, rhinorrhea, sinus pressure, sinus pain and trouble swallowing.    Respiratory:  Negative for cough and shortness of breath.    Cardiovascular:  Negative for chest pain and palpitations.   Gastrointestinal:  Positive for nausea. Negative for abdominal pain, constipation, diarrhea and vomiting.   Musculoskeletal:  Negative for arthralgias, back pain and myalgias.   Skin:  Negative for color change and rash.   Neurological:  Negative for dizziness, light-headedness and headaches.   All other systems reviewed and are negative.        Current Medications       Current Outpatient Medications:     amoxicillin (AMOXIL) 500 mg capsule, Take 1 capsule (500 mg total) by mouth every 12 (twelve) hours for 10 days, Disp: 20 capsule, Rfl: 0    lansoprazole (PREVACID) 15 mg capsule, Take 15 mg  by mouth as needed, Disp: , Rfl:     fluticasone (FLONASE) 50 mcg/act nasal spray, 1 spray into each nostril daily (Patient not taking: Reported on 2/9/2024), Disp: 9.9 mL, Rfl: 0    Current Allergies     Allergies as of 02/09/2024 - Reviewed 02/09/2024   Allergen Reaction Noted    Sulfate  01/29/2014            The following portions of the patient's history were reviewed and updated as appropriate: allergies, current medications, past family history, past medical history, past social history, past surgical history and problem list.     Past Medical History:   Diagnosis Date    GERD (gastroesophageal reflux disease)        Past Surgical History:   Procedure Laterality Date    ESOPHAGOSCOPY      EYE SURGERY      MOUTH SURGERY         Family History   Problem Relation Age of Onset    Hypertension Father     Lung cancer Maternal Grandfather     Hypertension Paternal Grandmother     Heart attack Paternal Grandmother     Hypertension Paternal Grandfather     Cancer Paternal Grandfather          Medications have been verified.        Objective   /68   Pulse 100   Temp 98.1 °F (36.7 °C)   Resp 18   LMP 02/08/2024 (Exact Date)   SpO2 99%        Physical Exam     Physical Exam  Vitals and nursing note reviewed.   Constitutional:       General: She is not in acute distress.     Appearance: Normal appearance. She is normal weight. She is not ill-appearing.   HENT:      Head: Normocephalic and atraumatic.      Right Ear: Tympanic membrane, ear canal and external ear normal.      Left Ear: Ear canal and external ear normal. A middle ear effusion is present.      Nose: Rhinorrhea present. Rhinorrhea is clear.      Mouth/Throat:      Lips: Pink.      Mouth: Mucous membranes are moist.      Pharynx: Uvula midline. Pharyngeal swelling and posterior oropharyngeal erythema present. No oropharyngeal exudate or uvula swelling.      Tonsils: Tonsillar exudate present. No tonsillar abscesses. 1+ on the right. 1+ on the left.    Eyes:      Extraocular Movements: Extraocular movements intact.      Conjunctiva/sclera: Conjunctivae normal.      Pupils: Pupils are equal, round, and reactive to light.   Cardiovascular:      Rate and Rhythm: Normal rate and regular rhythm.      Pulses: Normal pulses.      Heart sounds: Normal heart sounds.   Pulmonary:      Effort: Pulmonary effort is normal.      Breath sounds: Normal breath sounds.   Abdominal:      General: Abdomen is flat. Bowel sounds are normal.      Palpations: Abdomen is soft.   Musculoskeletal:         General: Normal range of motion.      Cervical back: Normal range of motion and neck supple.   Skin:     General: Skin is warm and dry.      Capillary Refill: Capillary refill takes less than 2 seconds.      Findings: No rash.   Neurological:      General: No focal deficit present.      Mental Status: She is alert and oriented to person, place, and time.   Psychiatric:         Mood and Affect: Mood normal.         Behavior: Behavior normal.

## 2024-03-15 ENCOUNTER — AMB VIDEO VISIT (OUTPATIENT)
Dept: OTHER | Facility: HOSPITAL | Age: 39
End: 2024-03-15

## 2024-03-18 NOTE — CARE ANYWHERE EVISITS
Visit Summary for BHAVIN DE LA CRUZ - Gender: Female - Date of Birth: 1985  Date: [id number] - Duration: 5 minutes  Patient: BHAVIN DE LA CRUZ  Provider: Josey Robledo    Patient Contact Information  Address  [address] [facility]; [place] [address]  [phone]    Visit Topics  Sore throat possible strep [Added By: Self - 2024-03-15]    Triage Questions   What is your current physical address in the event of a medical emergency? Answer []  Are you allergic to any medications? Answer []  Are you now or could you be pregnant? Answer []  Do you have any immune system compromise or chronic lung   disease? Answer []  Do you have any vulnerable family members in the home (infant, pregnant, cancer, elderly)? Answer []     Conversation Transcripts  [0A][0A] [Notification] Rafiq Del Cid, Global Staff, will help you prepare for your visit. She is assisting Josey Robledo, Emergency Medicine.[0A][Rafiq Del Cid] Alcides, and thank you for connecting. While you are waiting for the doctor, are there   any questions I can answer for you about our service? Please contact customer service if you have questions about billing, insurance, or technical issues. Visits work best with a stable WiFi connection, so please make sure you are connected before we   begin.[0A][Notification] Rafiq Del Cid has left the room.[0A][Notification] You are connected with Josey Robledo, Emergency Medicine.[0A][Notification] BHAVIN DE LA CRUZ is located in [place].[0A][Notification] BHAVIN DE LA CRUZ has shared health   history...[0A]    Diagnosis  Streptococcal pharyngitis    Procedures  Value: [id number] Code: CPT-4 UNLISTED E&M SERVICE    Medications Prescribed    amoxicillin  Dose : 1 capsule  Route : oral  Frequency : 2 times a day.     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 with strep[0A]Duration of symptoms: 1 day[0A][0A]Significant head congestion?[0A][0A][_] Yes  [x]   No[0A][0A]Comments: [0A][0A]Headache?[0A][0A][_] Yes  [x] No[0A][0A]Comments: [0A][0A]Nausea or vomiting?[0A][0A][_] Yes  [x] No[0A][0A]Comments: [0A][0A]Sick contacts?[0A][0A][x] Yes  [_] No[0A][0A]Comments: [0A][0A]History of recently recurrent strep   (>3-4 times this year)?[0A][0A][_] Yes  [x] No[0A][0A]Comments: [0A][0A]Household member with rheumatic heart disease or glomerulonephritis?[0A][0A][_] Yes  [x] No[0A][0A]Comments: [0A][0A]Other symptoms?[0A][0A][_] Yes  [x] No[0A][0A]Comments:   [0A][0A]Past Medical History:[0A][0A]Up to date with immunizations?[0A][0A][x] Yes  [_] No[0A][0A]Comments: [0A][0A]Past Surgical History: [0A]Medications: [0A]Allergies: sufa[0A]Social History:[0A][0A]Tobacco use?[0A][0A][_] Yes  [x]   No[0A][0A]Comments:[0A][0A]Alcohol use?[0A][0A][_] Yes  [x] No[0A][0A]Comments:[0A][0A]Recreational drug use?[0A][0A][_] Yes  [x] No[0A][0A]Comments:[0A][0A]Other (describe): [0A][0A][_] Yes  [_] No[0A][0A]Comments: [0A]Family History: [0A]If the patient   is female, is she pregnant or nursing?[0A][0A][_] Yes  [x] No[0A][0A]Comments: [0A]Examination[0A][0A]Vital signs (if available): [0A][0A]Weight: [0A][0A]Height: [0A][0A]BMI: [0A]General: [0A][0A][x] Normal  [_] Abnormal[0A][0A]Comments (describe key   positive and negative findings): [0A][0A]Mouth/pharynx: [0A][0A][_] Normal  [x] Abnormal[0A][0A]Comments (describe key positive and negative findings): hoarse voice erythema and exudates[0A]Neck/Lymphatic: [0A][0A][_] Normal  [x] Abnormal[0A][0A]Comments   (describe key positive and negative findings): ant cervical nodes[0A]Respiratory:  [0A][0A][x] Normal  [_] Abnormal[0A][0A]Comments (describe key positive and negative findings): [0A][0A]Other: [0A][0A][_] Normal  [_] Abnormal[0A][0A]Comments (describe   key positive and negative findings): [0A][0A]Assessment[0A][0A]Centor score[0A][0A]      1.    Absence of cough:[0A][0A]                a.    [x] Yes  [_] No[0A][0A]                b.    Comments: [0A][0A]      2.    Swollen/tender anterior cervical   nodes[0A][0A]                a.    [x] Yes  [_] No[0A][0A]                b.    Comments: [0A][0A]      3.    Temperature >100.4 [0A][0A]                a.    [_] Yes  [x] No[0A][0A]                b.    Comments: [0A][0A]      4.    Tonsillar exudates    [0A][0A]                a.    [x] Yes  [_] No[0A][0A]                b.    Comments: [0A][0A]      5.    Age [0A][0A]                a.    [_] 3-14 (score +1)  [x] 14-44 (score +0)  [_] >44 (score -1)[0A][0A]                b.    Comments: [0A][0A]        6.    Total score (+1 point for YES answers to questions 1-4, plus score on question 5 as indicated): [0A]Strep pharyngitis[0A]Plan[0A][0A]1.    Medications: Amoxicillin 500mg 1 po BID 10 days[0A]2.    Medical decision making: low[0A]        a.      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    Referral or follow up: [0A]For Virtual Primary Care Program[0A][0A]1.    Gaps in care closure (if patient stable): [0A][0A]        a.    Adults ages 20 and older should see a health care provider no less   than every two years- and much more often for follow up of chronic conditions.[0A][0A]        b.    Be sure to follow up with your PCP regularly for any chronic medical conditions.[0A][0A]2.    Labs: [0A][0A]3.    Referral Management: [0A][0A]4.      Follow up plan: [0A][0A]5.    Additional refills or forms: [0A][0A]Additional recommendations[0A][0A]1.    If you received a prescription at this visit and you have a question or problem, please call [phone] for prescription assistance.[0A][0A]2.       Please print a copy of this note and send it to your regular doctor or take it to your next visit so it may be included in your medical record.[0A][0A]3.    Please see your primary care provider on an annual basis or more frequently if directed.   [0A][0A]4.    If you are experiencing any new or worsening symptoms that you believe may be a medical emergency, please seek in-person care immediately.[0A][0A]5.    Additional recommendations: [0A]The patient voiced understanding and agreement with   plan.[0A]      Addendum added on 03/15/2024 03:46 PM EDTcalled in RX[0A]    Electronically signed by: Josey Robledo(NPI [id number])

## 2024-03-18 NOTE — CARE ANYWHERE EVISITS
Visit Summary for BHAVIN DE LA CRUZ - Gender: Female - Date of Birth: 1985  Date: [id number] - Duration: 5 minutes  Patient: BHAVIN DE LA CRUZ  Provider: Josey Robledo    Patient Contact Information  Address  [address] [facility] [address] [facility]; [place] [address]  [phone]    Visit Topics  Sore throat possible strep [Added By: Self - 2024-03-15]    Triage Questions   What is your current physical address in the event of a medical emergency? Answer []  Are you allergic to any medications? Answer []  Are you now or could you be pregnant? Answer []  Do you have any immune system compromise or chronic lung   disease? Answer []  Do you have any vulnerable family members in the home (infant, pregnant, cancer, elderly)? Answer []     Conversation Transcripts  [0A][0A] [Notification] Rafiq Del Cid, Global Staff, will help you prepare for your visit. She is assisting Josey Robledo, Emergency Medicine.[0A][Rafiq Del Cid] Alcides, and thank you for connecting. While you are waiting for the doctor, are there   any questions I can answer for you about our service? Please contact customer service if you have questions about billing, insurance, or technical issues. Visits work best with a stable WiFi connection, so please make sure you are connected before we   begin.[0A][Notification] Rafiq Del Cid has left the room.[0A][Notification] You are connected with Josey Robledo, Emergency Medicine.[0A][Notification] BHAVIN DE LA CRUZ is located in [place].[0A][Notification] BHAVIN DE LA CRUZ has shared health   history...[0A]    Diagnosis  Streptococcal pharyngitis    Procedures  Value: [id number] Code: CPT-4 UNLISTED E&M SERVICE    Medications Prescribed    amoxicillin  Dose : 1 capsule  Route : oral  Frequency : 2 times a day.     Refills : 0  Instructions to the Pharmacist : Substitutions allowed      Provider Notes  [0A][0A] [0A]Clinician has verified patient location and identification[0A][_] If patient is a minor parent/guardian and patient are both  present.[0A]Mode of Communication: [0A][0A][_] Phone[0A][0A][x] Video[0A]History of Present Illness[0A][0A]The   patient presents to discuss (describe): 39 yo F cc ST[0A]no fever, lymph nodes tender, no cough, pain on swallowing, 4th graders with strep[0A]Duration of symptoms: 1 day[0A][0A]Significant head congestion?[0A][0A][_] Yes  [x] No[0A][0A]Comments:   [0A][0A]Headache?[0A][0A][_] Yes  [x] No[0A][0A]Comments: [0A][0A]Nausea or vomiting?[0A][0A][_] Yes  [x] No[0A][0A]Comments: [0A][0A]Sick contacts?[0A][0A][x] Yes  [_] No[0A][0A]Comments: [0A][0A]History of recently recurrent strep (>3-4 times this   year)?[0A][0A][_] Yes  [x] No[0A][0A]Comments: [0A][0A]Household member with rheumatic heart disease or glomerulonephritis?[0A][0A][_] Yes  [x] No[0A][0A]Comments: [0A][0A]Other symptoms?[0A][0A][_] Yes  [x] No[0A][0A]Comments: [0A][0A]Past Medical   History:[0A][0A]Up to date with immunizations?[0A][0A][x] Yes  [_] No[0A][0A]Comments: [0A][0A]Past Surgical History: [0A]Medications: [0A]Allergies: sufa[0A]Social History:[0A][0A]Tobacco use?[0A][0A][_] Yes  [x] No[0A][0A]Comments:[0A][0A]Alcohol   use?[0A][0A][_] Yes  [x] No[0A][0A]Comments:[0A][0A]Recreational drug use?[0A][0A][_] Yes  [x] No[0A][0A]Comments:[0A][0A]Other (describe): [0A][0A][_] Yes  [_] No[0A][0A]Comments: [0A]Family History: [0A]If the patient is female, is she pregnant or   nursing?[0A][0A][_] Yes  [x] No[0A][0A]Comments: [0A]Examination[0A][0A]Vital signs (if available): [0A][0A]Weight: [0A][0A]Height: [0A][0A]BMI: [0A]General: [0A][0A][x] Normal  [_] Abnormal[0A][0A]Comments (describe key positive and negative findings):   [0A][0A]Mouth/pharynx: [0A][0A][_] Normal  [x] Abnormal[0A][0A]Comments (describe key positive and negative findings): hoarse voice erythema and exudates[0A]Neck/Lymphatic: [0A][0A][_] Normal  [x] Abnormal[0A][0A]Comments (describe key positive and   negative findings): ant cervical nodes[0A]Respiratory: [0A][0A][x] Normal   [_] Abnormal[0A][0A]Comments (describe key positive and negative findings): [0A][0A]Other: [0A][0A][_] Normal  [_] Abnormal[0A][0A]Comments (describe key positive and negative   findings): [0A][0A]Assessment[0A][0A]Centor score[0A][0A]      1.    Absence of cough:[0A][0A]                a.    [x] Yes  [_] No[0A][0A]                b.    Comments: [0A][0A]      2.    Swollen/tender anterior cervical nodes[0A][0A]                  a.    [x] Yes  [_] No[0A][0A]                b.    Comments: [0A][0A]      3.    Temperature >100.4 [0A][0A]                a.    [_] Yes  [x] No[0A][0A]                b.    Comments: [0A][0A]      4.    Tonsillar exudates  [0A][0A]                a.      [x] Yes  [_] No[0A][0A]                b.    Comments: [0A][0A]      5.    Age [0A][0A]                a.    [_] 3-14 (score +1)  [x] 14-44 (score +0)  [_] >44 (score -1)[0A][0A]                b.    Comments: [0A][0A]      6.    Total score (+1 point   for YES answers to questions 1-4, plus score on question 5 as indicated): [0A]Strep pharyngitis[0A]Plan[0A][0A]1.    Medications: Amoxicillin 500mg 1 po BID 10 days[0A]2.    Medical decision making: low[0A]        a.    Treatment chosen because: teacher   exposed[0A]3.    Referral or follow up: [0A]For Virtual Primary Care Program[0A][0A]1.    Gaps in care closure (if patient stable): [0A][0A]        a.    Adults ages 20 and older should see a health care provider no less than every two years- and much   more often for follow up of chronic conditions.[0A][0A]        b.    Be sure to follow up with your PCP regularly for any chronic medical conditions.[0A][0A]2.    Labs: [0A][0A]3.    Referral Management: [0A][0A]4.    Follow up plan: [0A][0A]5.      Additional refills or forms: [0A][0A]Additional recommendations[0A][0A]1.    If you received a prescription at this visit and you have a question or problem, please call [phone] for prescription assistance.[0A][0A]2.    Please print a copy  of this   note and send it to your regular doctor or take it to your next visit so it may be included in your medical record.[0A][0A]3.    Please see your primary care provider on an annual basis or more frequently if directed. [0A][0A]4.    If you are   experiencing any new or worsening symptoms that you believe may be a medical emergency, please seek in-person care immediately.[0A][0A]5.    Additional recommendations: [0A]The patient voiced understanding and agreement with plan.[0A]    Electronically signed by: Josey Robledo(NPI [id number])

## 2024-05-09 ENCOUNTER — AMB VIDEO VISIT (OUTPATIENT)
Dept: OTHER | Facility: HOSPITAL | Age: 39
End: 2024-05-09
Payer: COMMERCIAL

## 2024-05-09 DIAGNOSIS — J01.90 ACUTE SINUSITIS, RECURRENCE NOT SPECIFIED, UNSPECIFIED LOCATION: Primary | ICD-10-CM

## 2024-05-09 PROCEDURE — 99212 OFFICE O/P EST SF 10 MIN: CPT | Performed by: NURSE PRACTITIONER

## 2024-05-09 RX ORDER — AMOXICILLIN 875 MG/1
875 TABLET, COATED ORAL 2 TIMES DAILY
Qty: 20 TABLET | Refills: 0 | Status: SHIPPED | OUTPATIENT
Start: 2024-05-09 | End: 2024-05-19

## 2024-05-09 NOTE — CARE ANYWHERE EVISITS
Visit Summary for BHAVIN DE LA CRUZ - Gender: Female - Date of Birth: 1985  Date: [id number] - Duration: 3 minutes  Patient: BHAVIN DE LA CRUZ  Provider: Raina DRAKE    Patient Contact Information  Address  [address] [place] [address]; [place] [address]  [phone]    Visit Topics  Cold [Added By: Self - 2024-05-09]    Triage Questions   What is your current physical address in the event of a medical emergency? Answer []  Are you allergic to any medications? Answer []  Are you now or could you be pregnant? Answer []  Do you have any immune system compromise or chronic lung   disease? Answer []  Do you have any vulnerable family members in the home (infant, pregnant, cancer, elderly)? Answer []     Conversation Transcripts  [0A][0A] [Notification] You are connected with Raina DRAKE, Urgent Care Specialist.[0A][Notification] BHAVIN DE LA CRUZ is located in [place].[0A][Notification] BHAVIN DE LA CRUZ has shared health history...[0A]    Diagnosis  Acute sinusitis, unspecified    Procedures  Value: 42032 Code: CPT-4 UNLISTED E&M SERVICE    Medications Prescribed    No prescriptions ordered    Electronically signed by: Raina Thurston(NPI [id number])

## 2024-05-09 NOTE — PATIENT INSTRUCTIONS
Rest and drink extra fluids.  Start antibiotic.  Take probiotic.  OTC cough and cold as needed.  Follow up with PCP if no improvement.  Go to ER with any worsening symptoms.     Rhinosinusitis   WHAT YOU NEED TO KNOW:   Rhinosinusitis (RS) is inflammation or infection of your nasal passages and sinuses. RS is most often caused by a virus but may be caused by bacteria. Acute RS lasts up to 12 weeks. Chronic RS lasts more than 12 weeks. Recurrent RS means you have 4 or more infections in 1 year.       DISCHARGE INSTRUCTIONS:   Return to the emergency department if:   You have trouble breathing, or wheezing that is getting worse.    You have a stiff neck, a fever, or a bad headache.    You cannot open your eye.    Your eyeball bulges out, or you cannot move your eye.    You are more sleepy than usual, or you notice changes in your ability to think, move, or talk.    You have swelling of your forehead or scalp.    Call your doctor if:   You have vision changes, such as double vision.    Your eye and eyelid are red, swollen, and painful.    Your symptoms do not improve after 10 days.    You have nausea and are vomiting.    Your nose is bleeding.    You have questions or concerns about your condition or care.    Medicines:  Your symptoms may go away on their own. Your healthcare provider may recommend watchful waiting for up to 10 days before starting antibiotics. Antibiotics will not help if the infection is caused by a virus. Check with your provider before you take any over-the-counter medicines. You may need any of the following:  Acetaminophen  decreases pain and fever. It is available without a doctor's order. Ask how much to take and how often to take it. Follow directions. Read the labels of all other medicines you are using to see if they also contain acetaminophen, or ask your doctor or pharmacist. Acetaminophen can cause liver damage if not taken correctly.    NSAIDs , such as ibuprofen, help decrease swelling,  pain, and fever. This medicine is available with or without a doctor's order. NSAIDs can cause stomach bleeding or kidney problems in certain people. If you take blood thinner medicine, always ask your healthcare provider if NSAIDs are safe for you. Always read the medicine label and follow directions.    Nasal steroid sprays  help decrease inflammation in your nose and sinuses.    Decongestants  help reduce swelling and drain mucus in the nose and sinuses. They may help you breathe easier. Do not use decongestants for more than 3 days.    Antihistamines  help dry mucus in the nose and relieve sneezing.    Antibiotics  help treat or prevent a bacterial infection.    Self-care:   Rinse your sinuses as directed.  Use a sinus rinse device to rinse your nasal passages with a saline (salt water) solution or distilled water. Do not  use tap water. A sinus rinse will help thin the mucus in your nose and rinse away pollen and dirt. It will also help lower swelling so you can breathe normally.    Use a humidifier  to increase air moisture in your home. Moist air may make it easier for you to breathe and help decrease your cough.    Sleep with your head raised.  Place an extra pillow under your head before you go to sleep to help your sinuses drain.    Drink liquids as directed.  Ask your healthcare provider how much liquid to drink each day and which liquids are best for you. Liquids will thin the mucus in your nose and help it drain. Do not have drinks that contain alcohol or caffeine.    Do not smoke, and avoid secondhand smoke.  Nicotine and other chemicals in cigarettes and cigars can make your symptoms worse. Ask your healthcare provider for information if you currently smoke and need help to quit. E-cigarettes or smokeless tobacco still contain nicotine. Talk to your healthcare provider before you use these products.    Prevent the spread of germs:   Wash your hands often with soap and water.  Wash your hands after you  use the bathroom, change a child's diaper, or sneeze. Wash your hands before you prepare or eat food.         Stay away from people who are sick.  Some germs spread easily and quickly through contact.    Follow up with your doctor as directed:  You may be referred to an ear, nose, and throat specialist. Write down your questions so you remember to ask them during your visits.  © Copyright [employer] 2023 Information is for End User's use only and may not be sold, redistributed or otherwise used for commercial purposes.  The above information is an  only. It is not intended as medical advice for individual conditions or treatments. Talk to your doctor, nurse or pharmacist before following any medical regimen to see if it is safe and effective for you.

## 2024-05-09 NOTE — PROGRESS NOTES
Required Documentation:  Encounter provider: NOELLE Singh    Identify all parties in room with patient during virtual visit:  No one else    The patient was identified by name and date of birth. Sameera Clayton was informed that this is a telemedicine visit and that the visit is being conducted through the [employer] platform. She agrees to proceed..  My office door was closed. No one else was in the room.  She acknowledged consent and understanding of privacy and security of the video platform. The patient has agreed to participate and understands they can discontinue the visit at any time.    Verification of patient location:  Patient is located at Home in the following state in which I hold an active license [place]    Patient is aware this is a billable service.     Reason for visit is No chief complaint on file.       Subjective  This is a 38 year old female here today for video visit.  She states she started with a cold about 8 days ago.  She was getting better and symptoms now worsened. She started with sore throat and increased sinus pressure.  No fever.  No chest pain or sob.  She has taken ibuprofen and dayquil.  Yellow nasal discharge. She is eating and drinking okay.             Past Medical History:   Diagnosis Date    GERD (gastroesophageal reflux disease)        Past Surgical History:   Procedure Laterality Date    ESOPHAGOSCOPY      EYE SURGERY      MOUTH SURGERY          Allergies   Allergen Reactions    Sulfate        Review of Systems   Constitutional:  Positive for fatigue. Negative for activity change, chills and fever.   HENT:  Positive for congestion, rhinorrhea, sinus pressure and sinus pain.    Respiratory:  Positive for cough.    Neurological: Negative.    Psychiatric/Behavioral: Negative.         Video Exam    There were no vitals filed for this visit.    Physical Exam  Constitutional:       General: She is not in acute distress.     Appearance: Normal appearance. She is not  ill-appearing or toxic-appearing.   HENT:      Head: Normocephalic and atraumatic.   Eyes:      Conjunctiva/sclera: Conjunctivae normal.   Pulmonary:      Effort: Pulmonary effort is normal. No respiratory distress.   Neurological:      Mental Status: She is alert and oriented to person, place, and time.   Psychiatric:         Mood and Affect: Mood normal.         Behavior: Behavior normal.         Thought Content: Thought content normal.         Judgment: Judgment normal.         Visit Time  Total Visit Duration: 8 minutes    Assessment/Plan:    Diagnoses and all orders for this visit:    Acute sinusitis, recurrence not specified, unspecified location  -     amoxicillin (AMOXIL) 875 mg tablet; Take 1 tablet (875 mg total) by mouth 2 (two) times a day for 10 days        Patient Instructions   Rest and drink extra fluids.  Start antibiotic.  Take probiotic.  OTC cough and cold as needed.  Follow up with PCP if no improvement.  Go to ER with any worsening symptoms.     Rhinosinusitis   WHAT YOU NEED TO KNOW:   Rhinosinusitis (RS) is inflammation or infection of your nasal passages and sinuses. RS is most often caused by a virus but may be caused by bacteria. Acute RS lasts up to 12 weeks. Chronic RS lasts more than 12 weeks. Recurrent RS means you have 4 or more infections in 1 year.       DISCHARGE INSTRUCTIONS:   Return to the emergency department if:   You have trouble breathing, or wheezing that is getting worse.    You have a stiff neck, a fever, or a bad headache.    You cannot open your eye.    Your eyeball bulges out, or you cannot move your eye.    You are more sleepy than usual, or you notice changes in your ability to think, move, or talk.    You have swelling of your forehead or scalp.    Call your doctor if:   You have vision changes, such as double vision.    Your eye and eyelid are red, swollen, and painful.    Your symptoms do not improve after 10 days.    You have nausea and are vomiting.    Your nose  is bleeding.    You have questions or concerns about your condition or care.    Medicines:  Your symptoms may go away on their own. Your healthcare provider may recommend watchful waiting for up to 10 days before starting antibiotics. Antibiotics will not help if the infection is caused by a virus. Check with your provider before you take any over-the-counter medicines. You may need any of the following:  Acetaminophen  decreases pain and fever. It is available without a doctor's order. Ask how much to take and how often to take it. Follow directions. Read the labels of all other medicines you are using to see if they also contain acetaminophen, or ask your doctor or pharmacist. Acetaminophen can cause liver damage if not taken correctly.    NSAIDs , such as ibuprofen, help decrease swelling, pain, and fever. This medicine is available with or without a doctor's order. NSAIDs can cause stomach bleeding or kidney problems in certain people. If you take blood thinner medicine, always ask your healthcare provider if NSAIDs are safe for you. Always read the medicine label and follow directions.    Nasal steroid sprays  help decrease inflammation in your nose and sinuses.    Decongestants  help reduce swelling and drain mucus in the nose and sinuses. They may help you breathe easier. Do not use decongestants for more than 3 days.    Antihistamines  help dry mucus in the nose and relieve sneezing.    Antibiotics  help treat or prevent a bacterial infection.    Self-care:   Rinse your sinuses as directed.  Use a sinus rinse device to rinse your nasal passages with a saline (salt water) solution or distilled water. Do not  use tap water. A sinus rinse will help thin the mucus in your nose and rinse away pollen and dirt. It will also help lower swelling so you can breathe normally.    Use a humidifier  to increase air moisture in your home. Moist air may make it easier for you to breathe and help decrease your  cough.    Sleep with your head raised.  Place an extra pillow under your head before you go to sleep to help your sinuses drain.    Drink liquids as directed.  Ask your healthcare provider how much liquid to drink each day and which liquids are best for you. Liquids will thin the mucus in your nose and help it drain. Do not have drinks that contain alcohol or caffeine.    Do not smoke, and avoid secondhand smoke.  Nicotine and other chemicals in cigarettes and cigars can make your symptoms worse. Ask your healthcare provider for information if you currently smoke and need help to quit. E-cigarettes or smokeless tobacco still contain nicotine. Talk to your healthcare provider before you use these products.    Prevent the spread of germs:   Wash your hands often with soap and water.  Wash your hands after you use the bathroom, change a child's diaper, or sneeze. Wash your hands before you prepare or eat food.         Stay away from people who are sick.  Some germs spread easily and quickly through contact.    Follow up with your doctor as directed:  You may be referred to an ear, nose, and throat specialist. Write down your questions so you remember to ask them during your visits.  © Copyright [employer] 2023 Information is for End User's use only and may not be sold, redistributed or otherwise used for commercial purposes.  The above information is an  only. It is not intended as medical advice for individual conditions or treatments. Talk to your doctor, nurse or pharmacist before following any medical regimen to see if it is safe and effective for you.

## 2024-06-10 ENCOUNTER — AMB VIDEO VISIT (OUTPATIENT)
Dept: OTHER | Facility: HOSPITAL | Age: 39
End: 2024-06-10
Payer: COMMERCIAL

## 2024-06-10 DIAGNOSIS — H10.022 PINK EYE, LEFT: Primary | ICD-10-CM

## 2024-06-10 PROCEDURE — 99212 OFFICE O/P EST SF 10 MIN: CPT | Performed by: NURSE PRACTITIONER

## 2024-06-10 RX ORDER — TOBRAMYCIN 3 MG/ML
1 SOLUTION/ DROPS OPHTHALMIC
Qty: 5 ML | Refills: 0 | Status: SHIPPED | OUTPATIENT
Start: 2024-06-10 | End: 2024-06-17

## 2024-06-10 NOTE — CARE ANYWHERE EVISITS
Visit Summary for BHAVIN DE LA CRUZ - Gender: Female - Date of Birth: 1985  Date: [id number] - Duration: 3 minutes  Patient: BHAVIN DE LA CRUZ  Provider: Raina DRAKE    Patient Contact Information  Address  [address] [place] [address]; [place] [address]  [phone]    Visit Topics  Eye discharge  [Added By: Self - 2024-06-10]    Triage Questions   What is your current physical address in the event of a medical emergency? Answer []  Are you allergic to any medications? Answer []  Are you now or could you be pregnant? Answer []  Do you have any immune system compromise or chronic lung   disease? Answer []  Do you have any vulnerable family members in the home (infant, pregnant, cancer, elderly)? Answer []     Conversation Transcripts  [0A][0A] [Notification] You are connected with Raina DRAKE, Urgent Care Specialist.[0A][Notification] BHAVIN DE LA CRUZ is located in [place].[0A][Notification] BHAVIN DE LA CRUZ has shared health history...[0A]    Diagnosis  Other mucopurulent conjunctivitis, left eye    Procedures  Value: 32306 Code: CPT-4 UNLISTED E&M SERVICE    Medications Prescribed    No prescriptions ordered    Electronically signed by: Raina Thurston(NPI [id number])

## 2024-06-10 NOTE — PATIENT INSTRUCTIONS
Warm compresses 3 times per day.  Start eye drops.  Avoid rubbing eye.  Change pillow case.  Use a clean cloth each time. Follow up with eye doctor.  Go to ER with any worsening symptoms.     Conjunctivitis   WHAT YOU NEED TO KNOW:   Conjunctivitis, or pink eye, is inflammation of your conjunctiva. The conjunctiva is a thin tissue that covers the front of your eye and the back of your eyelid. The conjunctiva helps protect your eye and keep it moist. The most common cause of conjunctivitis is infection with bacteria or a virus. Allergies or exposure to a chemical may also cause conjunctivitis. Conjunctivitis is easily spread from person to person.       DISCHARGE INSTRUCTIONS:   Return to the emergency department if:   You have worsening eye pain.    The swelling in your eye gets worse, even after treatment.    Your vision suddenly becomes worse, or you cannot see at all.    Call your doctor if:   Your start to notice changes in your vision.    You develop a fever and ear pain.    You have tiny bumps or spots of blood on your eye.    You have questions or concerns about your condition or care.    Medicines:  You may need any of the following:  Allergy medicine  helps decrease itchy, red, swollen eyes caused by allergies. It may be given as a pill, eye drops, or nasal spray.    Antibiotics  may be needed if your conjunctivitis is caused by bacteria. This medicine may be given as a pill, eye drops, or eye ointment.    Take your medicine as directed.  Contact your healthcare provider if you think your medicine is not helping or if you have side effects. Tell your provider if you are allergic to any medicine. Keep a list of the medicines, vitamins, and herbs you take. Include the amounts, and when and why you take them. Bring the list or the pill bottles to follow-up visits. Carry your medicine list with you in case of an emergency.    Manage your symptoms:   Apply a cool compress.  Wet a washcloth with cold water and  place it on your eye. This will help decrease itching and irritation.    Use artificial tears.  This will help lessen your symptoms, including itching or irritation.    Do not wear contact lenses  until treatment is complete and your symptoms are gone.    Flush your eye.  You may need to flush your eye with saline to help decrease your symptoms. Ask for more information on how to flush your eye.    Prevent the spread of conjunctivitis:   Wash your hands with soap and water often.  Wash your hands before and after you touch your eyes. Wash your hands after you use the bathroom, change a child's diaper, or sneeze. Wash your hands before you prepare or eat food.         Avoid contact with others.  Do not share towels or washcloths. Try to stay away from others as much as possible. Ask when you can return to work or school.    Avoid allergens and irritants.  Try to avoid the things that cause your allergies, such as pets, dust, or grass. Stay away from smoke filled areas. Shield your eyes from wind and sun.    Throw away eye makeup.  Bacteria can stay in eye makeup. Throw away your current mascara and other eye makeup. Never share mascara or other eye makeup with anyone.    Follow up with your doctor as directed:  You may be referred to an ophthalmologist for treatment. Write down your questions so you remember to ask them during your visits.  © Copyright [employer] 2023 Information is for End User's use only and may not be sold, redistributed or otherwise used for commercial purposes.  The above information is an  only. It is not intended as medical advice for individual conditions or treatments. Talk to your doctor, nurse or pharmacist before following any medical regimen to see if it is safe and effective for you.

## 2024-06-10 NOTE — PROGRESS NOTES
Required Documentation:  Encounter provider: NOELLE Singh    Identify all parties in room with patient during virtual visit:  No one else    The patient was identified by name and date of birth. Sameera Clayton was informed that this is a telemedicine visit and that the visit is being conducted through the [employer] platform. She agrees to proceed..  My office door was closed. No one else was in the room.  She acknowledged consent and understanding of privacy and security of the video platform. The patient has agreed to participate and understands they can discontinue the visit at any time.    Verification of patient location:  Patient is located at Home in the following state in which I hold an active license [place]    Patient is aware this is a billable service.     Reason for visit is No chief complaint on file.       Subjective  This is a 38 year old female here today for video visit.  She states 4 days ago she started with nasal congestion and sore throat.  She states he sore throat is improving and sinus congestion is getting better.  She states she started with discharge from her left eye about 4 hours ago.  She denies any eye pain or change in vision. Cloudy with discharge.  She denies any eye trauma.            Past Medical History:   Diagnosis Date    GERD (gastroesophageal reflux disease)        Past Surgical History:   Procedure Laterality Date    ESOPHAGOSCOPY      EYE SURGERY      MOUTH SURGERY          Allergies   Allergen Reactions    Sulfate        Review of Systems   Constitutional:  Negative for activity change, chills, fatigue and fever.   Eyes:  Positive for discharge and redness.   Respiratory: Negative.     Cardiovascular: Negative.    Neurological: Negative.    Psychiatric/Behavioral: Negative.         Video Exam    There were no vitals filed for this visit.    Physical Exam  Constitutional:       General: She is not in acute distress.     Appearance: Normal appearance. She is not  ill-appearing or toxic-appearing.   Eyes:      Comments: Left eye mildly injected.    Neurological:      Mental Status: She is alert and oriented to person, place, and time.   Psychiatric:         Mood and Affect: Mood normal.         Behavior: Behavior normal.         Thought Content: Thought content normal.         Judgment: Judgment normal.         Visit Time  Total Visit Duration: 5 minutes    Assessment/Plan:    Diagnoses and all orders for this visit:    Pink eye, left  -     tobramycin (TOBREX) 0.3 % SOLN; Administer 1 drop to the right eye every 4 (four) hours while awake for 7 days        Patient Instructions   Warm compresses 3 times per day.  Start eye drops.  Avoid rubbing eye.  Change pillow case.  Use a clean cloth each time. Follow up with eye doctor.  Go to ER with any worsening symptoms.     Conjunctivitis   WHAT YOU NEED TO KNOW:   Conjunctivitis, or pink eye, is inflammation of your conjunctiva. The conjunctiva is a thin tissue that covers the front of your eye and the back of your eyelid. The conjunctiva helps protect your eye and keep it moist. The most common cause of conjunctivitis is infection with bacteria or a virus. Allergies or exposure to a chemical may also cause conjunctivitis. Conjunctivitis is easily spread from person to person.       DISCHARGE INSTRUCTIONS:   Return to the emergency department if:   You have worsening eye pain.    The swelling in your eye gets worse, even after treatment.    Your vision suddenly becomes worse, or you cannot see at all.    Call your doctor if:   Your start to notice changes in your vision.    You develop a fever and ear pain.    You have tiny bumps or spots of blood on your eye.    You have questions or concerns about your condition or care.    Medicines:  You may need any of the following:  Allergy medicine  helps decrease itchy, red, swollen eyes caused by allergies. It may be given as a pill, eye drops, or nasal spray.    Antibiotics  may be  needed if your conjunctivitis is caused by bacteria. This medicine may be given as a pill, eye drops, or eye ointment.    Take your medicine as directed.  Contact your healthcare provider if you think your medicine is not helping or if you have side effects. Tell your provider if you are allergic to any medicine. Keep a list of the medicines, vitamins, and herbs you take. Include the amounts, and when and why you take them. Bring the list or the pill bottles to follow-up visits. Carry your medicine list with you in case of an emergency.    Manage your symptoms:   Apply a cool compress.  Wet a washcloth with cold water and place it on your eye. This will help decrease itching and irritation.    Use artificial tears.  This will help lessen your symptoms, including itching or irritation.    Do not wear contact lenses  until treatment is complete and your symptoms are gone.    Flush your eye.  You may need to flush your eye with saline to help decrease your symptoms. Ask for more information on how to flush your eye.    Prevent the spread of conjunctivitis:   Wash your hands with soap and water often.  Wash your hands before and after you touch your eyes. Wash your hands after you use the bathroom, change a child's diaper, or sneeze. Wash your hands before you prepare or eat food.         Avoid contact with others.  Do not share towels or washcloths. Try to stay away from others as much as possible. Ask when you can return to work or school.    Avoid allergens and irritants.  Try to avoid the things that cause your allergies, such as pets, dust, or grass. Stay away from smoke filled areas. Shield your eyes from wind and sun.    Throw away eye makeup.  Bacteria can stay in eye makeup. Throw away your current mascara and other eye makeup. Never share mascara or other eye makeup with anyone.    Follow up with your doctor as directed:  You may be referred to an ophthalmologist for treatment. Write down your questions so you  remember to ask them during your visits.  © Copyright [employer] 2023 Information is for End User's use only and may not be sold, redistributed or otherwise used for commercial purposes.  The above information is an  only. It is not intended as medical advice for individual conditions or treatments. Talk to your doctor, nurse or pharmacist before following any medical regimen to see if it is safe and effective for you.